# Patient Record
Sex: FEMALE | Race: OTHER
[De-identification: names, ages, dates, MRNs, and addresses within clinical notes are randomized per-mention and may not be internally consistent; named-entity substitution may affect disease eponyms.]

---

## 2018-09-03 ENCOUNTER — HOSPITAL ENCOUNTER (EMERGENCY)
Dept: HOSPITAL 74 - FER | Age: 43
Discharge: HOME | End: 2018-09-03
Payer: COMMERCIAL

## 2018-09-03 VITALS — DIASTOLIC BLOOD PRESSURE: 81 MMHG | HEART RATE: 72 BPM | TEMPERATURE: 98.5 F | SYSTOLIC BLOOD PRESSURE: 126 MMHG

## 2018-09-03 VITALS — BODY MASS INDEX: 27.4 KG/M2

## 2018-09-03 DIAGNOSIS — K21.9: ICD-10-CM

## 2018-09-03 DIAGNOSIS — R05: Primary | ICD-10-CM

## 2018-09-03 DIAGNOSIS — D50.9: ICD-10-CM

## 2018-09-03 LAB
ALBUMIN SERPL-MCNC: 3.8 G/DL (ref 3.5–5)
ALP SERPL-CCNC: 70 U/L (ref 32–92)
ALT SERPL-CCNC: 11 U/L (ref 10–40)
ANION GAP SERPL CALC-SCNC: 7 MMOL/L (ref 8–16)
ANISOCYTOSIS BLD QL: (no result)
AST SERPL-CCNC: 15 U/L (ref 10–42)
BACTERIA #/AREA URNS HPF: (no result) /HPF
BILIRUB SERPL-MCNC: 1 MG/DL (ref 0.2–1)
BILIRUB UR STRIP.AUTO-MCNC: (no result) MG/DL
BUN SERPL-MCNC: 7 MG/DL (ref 7–18)
CALCIUM SERPL-MCNC: 8.8 MG/DL (ref 8.4–10.2)
CHLORIDE SERPL-SCNC: 106 MMOL/L (ref 98–107)
CO2 SERPL-SCNC: 24 MMOL/L (ref 22–28)
COLOR UR: (no result)
CREAT SERPL-MCNC: 0.7 MG/DL (ref 0.6–1.3)
DEPRECATED RDW RBC AUTO: 17.3 % (ref 11.6–15.6)
GLUCOSE SERPL-MCNC: 101 MG/DL (ref 74–106)
HCT VFR BLD CALC: 25.1 % (ref 32.4–45.2)
HGB BLD-MCNC: 7.5 GM/DL (ref 10.7–15.3)
KETONES UR QL STRIP: (no result)
LIPASE SERPL-CCNC: 73 U/L (ref 73–393)
MCH RBC QN AUTO: 19.5 PG (ref 25.7–33.7)
MCHC RBC AUTO-ENTMCNC: 29.9 G/DL (ref 32–36)
MCV RBC: 65.2 FL (ref 80–96)
PH UR: >= 9 [PH] (ref 4.5–8)
PLATELET # BLD AUTO: 213 K/MM3 (ref 134–434)
PLATELET BLD QL SMEAR: ADEQUATE
PMV BLD: 8.8 FL (ref 7.5–11.1)
POTASSIUM SERPLBLD-SCNC: 3.9 MMOL/L (ref 3.5–5.1)
PROT SERPL-MCNC: 7 G/DL (ref 6.4–8.3)
PROT UR QL STRIP: (no result)
RBC # BLD AUTO: (no result) /HPF (ref 0–3)
RBC # BLD AUTO: 3.85 M/MM3 (ref 3.6–5.2)
SODIUM SERPL-SCNC: 137 MMOL/L (ref 136–145)
SP GR UR: 1.01 (ref 1–1.02)
UROBILINOGEN UR STRIP-MCNC: 1 MG/DL (ref 0.2–1)
WBC # BLD AUTO: 12.1 K/MM3 (ref 4–10.8)
WBC # UR AUTO: (no result) /UL (ref 0–5)

## 2018-09-03 PROCEDURE — 3E033GC INTRODUCTION OF OTHER THERAPEUTIC SUBSTANCE INTO PERIPHERAL VEIN, PERCUTANEOUS APPROACH: ICD-10-PCS

## 2018-09-03 PROCEDURE — 3E0337Z INTRODUCTION OF ELECTROLYTIC AND WATER BALANCE SUBSTANCE INTO PERIPHERAL VEIN, PERCUTANEOUS APPROACH: ICD-10-PCS

## 2018-09-03 NOTE — PDOC
*Physical Exam





- Vital Signs


 Last Vital Signs











Temp Pulse Resp BP Pulse Ox


 


 98.5 F   72   18   126/81   99 


 


 09/03/18 18:20  09/03/18 18:20  09/03/18 18:20  09/03/18 18:20  09/03/18 18:20














ED Treatment Course





- LABORATORY


CBC & Chemistry Diagram: 


 09/03/18 19:08





 09/03/18 19:08





- ADDITIONAL ORDERS


Additional order review: 


 Laboratory  Results











  09/03/18





  18:52


 


Urine HCG, Qual  Negative














*DC/Admit/Observation/Transfer





- Discharge Dispostion


Condition at time of disposition: Stable





- Referrals


Referrals: 


Yamilka Fenton MD [Primary Care Provider] - 





- Patient Instructions





- Post Discharge Activity

## 2018-09-03 NOTE — PDOC
Attending Attestation





- Resident


Resident Name: Dhiraj Warner





- ED Attending Attestation


I have performed the following: I have examined & evaluated the patient, The 

case was reviewed & discussed with the resident, I agree w/resident's findings 

& plan, Exceptions are as noted





- HPI


HPI: 





09/05/18 07:22


Not seen by the resident. Interviewed and examined by me. See note in medical 

record.





- Physicial Exam


PE: 





09/06/18 07:14


Abdomen nondistended soft without mass or organomegaly mild suprapubic 

tenderness to deep palpation, no guarding or rebound





- Medical Decision Making





09/06/18 07:14


Assessment: Usual response to menstrual cramps, but exaggerated this cycle, 

other possibilities include gastroenteritis, ovarian disease





Plan: Symptomatic treatment, workup and further evaluation depending on 

results. Signed out to Dr. Maloney 7 PM pending lab results and response to 

therapy.

## 2018-09-03 NOTE — PDOC
History of Present Illness





- General


History Source: Patient


Exam Limitations: No Limitations





- History of Present Illness


Initial Comments: 





09/03/18 21:28


The patient is a 42 year old female with a significant past medical history of 

iron deficiency anemia (not on medication for several month), endometriosis and 

uterine fibroids who presents to the ED with complaints of abdominal pain since 

earlier today. The patient reports pain to her periumbilical region and 

superpubic region since 5am this mornirng. She states the pain is burning, 

intermittent and rates it a 8/10. Patient reports she took advil, applied a 

heating pad, and her mother gave her a powder for an upset stomach with no 

relief of present symptoms. She also reports 2 episodes of vomiting, no blood, 

and 5-6 episodes of diarrhea, no blood, associated with present symptoms. 

Patient states she typically gets these symptoms every month when she gets her 

menstrual period. She notes she is currently on her period but states her 

symptoms are worse than normal. Patient notes her menstrual period started on 

Saturday. 


Patient reports a history of UTI several months ago. 


Denies fever or chills. Denies dysuria or changes in urinary output. Denies 

chest pain or shortness of breath. Denies any other symptoms. 











<Simran Tao - Last Filed: 09/03/18 21:28>





<Lo Maloney - Last Filed: 09/04/18 03:36>





- General


Chief Complaint: Pain


Stated Complaint: ABD PAIN, VOMITING, DIARRHEA


Time Seen by Provider: 09/03/18 19:28





Past History





<Simran Tao - Last Filed: 09/03/18 21:28>





- Past Medical History


Anemia: Yes (was on iron pills)


Asthma: No


Cancer: No


Cardiac Disorders: No


CVA: No


COPD: No


CHF: No


Dementia: No


Diabetes: No


GI Disorders: No


 Disorders: No


HTN: No


Hypercholesterolemia: No


Liver Disease: No


Seizures: No


Thyroid Disease: No


Other medical history: ENDOMETRIOSIS, FIBROID





- Surgical History


Abdominal Surgery: Yes (FIBROID)


Orthopedic Surgery: Yes (Right Knee Arthroscopy x2)





- Reproductive History


Is Patient Pregnant Now?: No


Dysfunctional Uterine Bleeding: Yes





- Suicide/Smoking/Psychosocial Hx


Smoking Status: No


Smoking History: Never smoked


Have you smoked in the past 12 months: No


Number of Cigarettes Smoked Daily: 0


Information on smoking cessation initiated: No


Hx Alcohol Use: No


Drug/Substance Use Hx: No


Substance Use Type: None


Hx Substance Use Treatment: No





<Lo Maloney - Last Filed: 09/04/18 03:36>





- Past Medical History


Allergies/Adverse Reactions: 


 Allergies











Allergy/AdvReac Type Severity Reaction Status Date / Time


 


No Known Allergies Allergy   Verified 09/03/18 18:21











Home Medications: 


Ambulatory Orders





Ciprofloxacin [Cipro (Restricted To Id)] 500 mg PO Q12H #10 tablet 09/03/18 


Diclofenac Sodium [Voltaren -] 75 mg PO BID PRN #7 tablet.dr 09/03/18 


Ondansetron [Zofran Odt -] 4 mg SL BID #10 od.tablet 09/03/18 











**Review of Systems





- Review of Systems


Able to Perform ROS?: Yes


Comments:: 





09/03/18 21:28








CONSTITUTIONAL: 


Absent: fever, chills, diaphoresis, generalized weakness, malaise, loss of 

appetite


HEENT: 


Absent: rhinorrhea, nasal congestion, throat pain, throat swelling, difficulty 

swallowing, mouth swelling, ear pain, eye pain, visual Changes


CARDIOVASCULAR: 


Absent: chest pain, syncope, palpitations, irregular heart rate, lightheadedness

, peripheral edema


RESPIRATORY: 


Absent: cough, shortness of breath, dyspnea with exertion, orthopnea, wheezing, 

stridor, hemoptysis


GASTROINTESTINAL: + abdominal pain, nausea, vomiting, diarrhea 


Absent:  abdominal distension, constipation, melena, hematochezia


GENITOURINARY: 


Absent: dysuria, frequency, urgency, hesitancy, hematuria, flank pain, genital 

pain


MUSCULOSKELETAL: 


Absent: myalgia, arthralgia, joint swelling


SKIN: 


Absent: rash, itching, pallor


HEMATOLOGIC/IMMUNOLOGIC: 


Absent: easy bleeding, easy bruising, lymphadenopathy, frequent infections


ENDOCRINE:


Absent: unexplained weight gain, unexplained weight loss, heat intolerance, 

cold intolerance


NEUROLOGIC: 


Absent: headache, focal weakness or paresthesias, dizziness, unsteady gait, 

seizure, mental status changes, bladder or bowel incontinence


PSYCHIATRIC: 


Absent: anxiety, depression, suicidal or homicidal ideation, hallucinations.











All Other Systems: Reviewed and Negative





<Simran Tao - Last Filed: 09/03/18 21:28>





*Physical Exam





- Vital Signs


 Last Vital Signs











Temp Pulse Resp BP Pulse Ox


 


 98.5 F   72   18   126/81   99 


 


 09/03/18 18:20  09/03/18 18:20  09/03/18 18:20  09/03/18 18:20  09/03/18 18:20














- Physical Exam


Comments: 





09/03/18 21:29





GENERAL: The patient is awake, alert, and fully oriented, in no acute distress.


HEAD: Normal with no signs of trauma.


EYES: Pupils equal, round and reactive to light, extraocular movements intact, 

sclera anicteric, conjunctiva clear with no pallor.


ENT: + dry mucous membranes.Ears normal, nares patent, oropharynx clear without 

exudates.  


NECK: Normal range of motion, supple without lymphadenopathy, JVD, or masses.


LUNGS: Breath sounds equal, clear to auscultation bilaterally.  No wheeze/

crackles.


HEART: Regular rate and rhythm, normal S1 and S2 without murmur or rub.


ABDOMEN: + mild superspubic tenderness. Soft/nondistended. BS wnl.  No guarding 

or rebound.  No palpable masses. No hepatosplenomegaly.


 EXTREMITIES: Normal range of motion, no edema.  No clubbing or cyanosis. No 

cords, erythema, or tenderness.


NEUROLOGICAL: Cranial nerves II through XII grossly intact.  Normal speech, 

normal gait.


PSYCH: Normal mood, normal affect.


SKIN: Warm, Dry, normal turgor, no rashes or lesions noted.





<Simran Tao - Last Filed: 09/03/18 21:28>





- Vital Signs


 Last Vital Signs











Temp Pulse Resp BP Pulse Ox


 


 98.5 F   72   18   126/81   99 


 


 09/03/18 18:20  09/03/18 18:20  09/03/18 18:20  09/03/18 18:20  09/03/18 18:20














<Lo Maloney - Last Filed: 09/04/18 03:36>





ED Treatment Course





- LABORATORY


CBC & Chemistry Diagram: 


 09/03/18 19:08





 09/03/18 19:08





- ADDITIONAL ORDERS


Additional order review: 


 Laboratory  Results











  09/03/18 09/03/18





  19:08 18:52


 


Sodium  137 


 


Potassium  3.9 


 


Chloride  106 


 


Carbon Dioxide  24 


 


Anion Gap  7 L 


 


BUN  7 


 


Creatinine  0.7 


 


Creat Clearance w eGFR  > 60 


 


Random Glucose  101 


 


Calcium  8.8 


 


Total Bilirubin  1.0 


 


AST  15  D 


 


ALT  11 


 


Alkaline Phosphatase  70 


 


Total Protein  7.0 


 


Albumin  3.8 


 


Lipase  73 


 


Urine Color   Dark


 


Urine Appearance   Clear


 


Urine pH   >= 9.0 H


 


Ur Specific Gravity   1.015


 


Urine Protein   1+ H D


 


Urine Glucose (UA)   Trace


 


Urine Ketones   2+ H


 


Urine Blood   1+ H


 


Urine Nitrite   Positive


 


Urine Bilirubin   1+ H


 


Urine Urobilinogen   1.0


 


Ur Leukocyte Esterase   Negative


 


Urine RBC   20-30


 


Urine WBC   0-2


 


Urine Bacteria   Few


 


Urine HCG, Qual   Negative








 











  09/03/18





  19:08


 


RBC  3.85


 


MCV  65.2 L D


 


MCHC  29.9 L


 


RDW  17.3 H D


 


MPV  8.8


 


Neutrophils %  No Result Required.


 


Lymphocytes %  No Result Required.














- Medications


Given in the ED: 


ED Medications














Discontinued Medications














Generic Name Dose Route Start Last Admin





  Trade Name Freq  PRN Reason Stop Dose Admin


 


Sodium Chloride  1,000 mls @ 1,000 mls/hr  09/03/18 18:36  09/03/18 19:08





  Normal Saline -  IV  09/03/18 19:35  1,000 mls/hr





  ASDIR STA   Administration





     





     





     





     


 


Ondansetron HCl  4 mg  09/03/18 18:36  09/03/18 19:10





  Zofran Injection  IVPB  09/03/18 18:37  4 mg





  ONCE ONE   Administration





     





     





     





     














<Simran Tao - Last Filed: 09/03/18 21:28>





- LABORATORY


CBC & Chemistry Diagram: 


 09/03/18 19:08





 09/03/18 19:08





- ADDITIONAL ORDERS


Additional order review: 


 Laboratory  Results











  09/03/18 09/03/18





  19:08 18:52


 


Sodium  137 


 


Potassium  3.9 


 


Chloride  106 


 


Carbon Dioxide  24 


 


Anion Gap  7 L 


 


BUN  7 


 


Creatinine  0.7 


 


Creat Clearance w eGFR  > 60 


 


Random Glucose  101 


 


Calcium  8.8 


 


Total Bilirubin  1.0 


 


AST  15  D 


 


ALT  11 


 


Alkaline Phosphatase  70 


 


Total Protein  7.0 


 


Albumin  3.8 


 


Lipase  73 


 


Urine Color   Dark


 


Urine Appearance   Clear


 


Urine pH   >= 9.0 H


 


Ur Specific Gravity   1.015


 


Urine Protein   1+ H D


 


Urine Glucose (UA)   Trace


 


Urine Ketones   2+ H


 


Urine Blood   1+ H


 


Urine Nitrite   Positive


 


Urine Bilirubin   1+ H


 


Urine Urobilinogen   1.0


 


Ur Leukocyte Esterase   Negative


 


Urine RBC   20-30


 


Urine WBC   0-2


 


Urine Bacteria   Few


 


Urine HCG, Qual   Negative








 











  09/03/18





  19:08


 


RBC  3.85


 


MCV  65.2 L D


 


MCHC  29.9 L


 


RDW  17.3 H D


 


MPV  8.8


 


Neutrophils %  No Result Required.


 


Lymphocytes %  No Result Required.














- Medications


Given in the ED: 


ED Medications














Discontinued Medications














Generic Name Dose Route Start Last Admin





  Trade Name Uzair  PRN Reason Stop Dose Admin


 


Sodium Chloride  1,000 mls @ 1,000 mls/hr  09/03/18 18:36  09/03/18 19:08





  Normal Saline -  IV  09/03/18 19:35  1,000 mls/hr





  ASDIR STA   Administration





     





     





     





     


 


Ondansetron HCl  4 mg  09/03/18 18:36  09/03/18 19:10





  Zofran Injection  IVPB  09/03/18 18:37  4 mg





  ONCE ONE   Administration





     





     





     





     














<Lo Maloney - Last Filed: 09/04/18 03:36>





Progress Note





- Progress Note


Progress Note: 





Documentation has been prepared under my direction and personally reviewed by 

me in its entirety. I attest that this documented accurately reflects all work, 

treatment, procedures and medical decision making performed by me.





<Lo Maloney - Last Filed: 09/04/18 03:36>





Medical Decision Making





- Medical Decision Making





As noted above, this 42-year-old woman with a history of gastrointestinal 

symptoms that occur around her menstrual period presents with nausea/vomiting/

diarrhea (as usual during menstruation) but more severe mid abdominal/flank 

pain.  Exam as noted.


Patient was given 1 L normal saline/Zofran 4 mg IV.


Laboratory evaluation notable for significant anemia (8/25); patient has a 

history of iron deficiency anemia but has not taken iron supplement for several 

months.  Also, urinalysis notable for positive nitrite, WBCs/RBCs/few bacteria. 

Urine c&s sent.








Patient felt significantly better after 1 L normal saline and Zofran 4 mg IV.





Patient will be treated for presumed UTI with Cipro 500 mg and prescription for 

500 mg twice a day for 5 days will be sent to her pharmacy.  Meanwhile, Zofran 

ODT 4 mg will be sent to her pharmacy








<Lo Maloney - Last Filed: 09/04/18 03:36>





*DC/Admit/Observation/Transfer





- Attestations


Scribe Attestion: 





09/03/18 21:29





Documentation prepared by Simran Tao, acting as medical scribe for Lo Maloney MD





<Simran Tao - Last Filed: 09/03/18 21:28>





<Lo Maloney - Last Filed: 09/04/18 03:36>


Diagnosis at time of Disposition: 


UTI (urinary tract infection)


Qualifiers:


 Urinary tract infection type: acute cystitis Hematuria presence: without 

hematuria Qualified Code(s): N30.00 - Acute cystitis without hematuria





GERD (gastroesophageal reflux disease)


Qualifiers:


 Esophagitis presence: without esophagitis Qualified Code(s): K21.9 - Gastro-

esophageal reflux disease without esophagitis





Iron deficiency anemia


Qualifiers:


 Iron deficiency anemia type: unspecified iron deficiency Qualified Code(s): 

D50.9 - Iron deficiency anemia, unspecified








- Discharge Dispostion


Disposition: HOME


Condition at time of disposition: Stable





- Prescriptions


Prescriptions: 


Ciprofloxacin [Cipro (Restricted To Id)] 500 mg PO Q12H #10 tablet


Diclofenac Sodium [Voltaren -] 75 mg PO BID PRN #7 tablet.dr


 PRN Reason: Moderate Pain


Ondansetron [Zofran Odt -] 4 mg SL BID #10 od.tablet





- Referrals


Referrals: 


Yamilka Fenton MD [Primary Care Provider] - 





- Patient Instructions


Printed Discharge Instructions:  DI for Urinary Tract Infection (UTI)


Additional Instructions: 


 Clear liquids, advance diet cautiously as tolerated


Zofran ODT 4 mg up to twice a day as needed for nausea/vomiting


Cipro 500 mg twice a day for 5 days


Diclofenac 75 mg twice a day as needed for pain; take with food


Can use Pepcid OTC as needed for reflux stomach pain as directed


Follow-up with your general doctor within the next 3-4 days


Follow-up with your gynecologist within 1 week as discussed


Return to ER if you have severe pain/fever or have persistent vomiting





- Post Discharge Activity

## 2019-04-07 ENCOUNTER — HOSPITAL ENCOUNTER (EMERGENCY)
Dept: HOSPITAL 74 - FER | Age: 44
Discharge: HOME | End: 2019-04-07
Payer: COMMERCIAL

## 2019-04-07 VITALS — DIASTOLIC BLOOD PRESSURE: 80 MMHG | HEART RATE: 90 BPM | SYSTOLIC BLOOD PRESSURE: 120 MMHG | TEMPERATURE: 99 F

## 2019-04-07 VITALS — BODY MASS INDEX: 28.7 KG/M2

## 2019-04-07 DIAGNOSIS — J06.9: Primary | ICD-10-CM

## 2019-04-07 DIAGNOSIS — J02.9: ICD-10-CM

## 2019-04-07 PROCEDURE — 3E0F7GC INTRODUCTION OF OTHER THERAPEUTIC SUBSTANCE INTO RESPIRATORY TRACT, VIA NATURAL OR ARTIFICIAL OPENING: ICD-10-PCS

## 2019-04-07 RX ADMIN — IPRATROPIUM BROMIDE AND ALBUTEROL SULFATE SCH AMP: .5; 3 SOLUTION RESPIRATORY (INHALATION) at 12:34

## 2019-04-07 RX ADMIN — IPRATROPIUM BROMIDE AND ALBUTEROL SULFATE SCH AMP: .5; 3 SOLUTION RESPIRATORY (INHALATION) at 12:45

## 2019-04-07 NOTE — PDOC
History of Present Illness





- General


Chief Complaint: Sore Throat


Stated Complaint: SORE THROAT


Time Seen by Provider: 04/07/19 11:44


History Source: Patient


Exam Limitations: No Limitations





- History of Present Illness


Initial Comments: 





04/07/19 12:07





HPI


43 year old female with a significant past medical history of iron deficiency 

anemia, endometriosis and uterine fibroids, allergies presenting with 

productive yellow cough, nasal congestion and sore throat x 2 weeks, not 

improving. Pt states the nasal congestion has been extending down her throat 

and causing left jaw and ear pain. Has been tolerating oral and fluid intake. 

Took mucinex yesterday without relief.


Denies fever, chills, chest pain, SOB, palpitation, dizziness, weakness, N, V, D

, abdominal pain, bladder and bowel problems, leg swelling, No sick contacts or 

travel. No new changes in medications. No suspicious food intake


Allergies: NKA


Past Medical History:   iron deficiency anemia, endometriosis and uterine 

fibroids, allergies 


Social history: Lives with family. No smoking. No alcohol. No illicit drugs.


Surgical history:  noncontributory





Review of systems


Constitutional: no fevers or chills.


HEENT: no headache or dizziness. No visual/hearing disturbances. +ear pain, +

sore throat, +nasal congestion


CVS:  no cp or syncope.


Resp: no sob. +cough and congestion.


Gastrointestinal: no abdominal pain, nausea or vomiting. 


Genitourinary: no urinary sx, hematuria.


MUSCULOSKELETAL: No joint pain and swelling. No neck or back pain.


SKIN: no redness or skin changes, no discharge, no rash. No wounds.


Hematologic: no easy bruising/bleeding. 


NEUROLOGIC: No headache, dizziness, LOC or altered mental status. No weakness, 

numbness or tingling. 


Allergic/Immunologic: no allergies


All other systems reviewed and negative, or as documented in HPI. 





Physical exam:


General:   Well appearing, awake and alert, NAD. speaking full sentences.


HEENT:  NCAT, PERRL, EOMI, clear conjunctiva, anicteric, moist mucus membranes, 

clear oropharynx. Airway patent, normal phonation. Uvula midline. No sinus 

tenderness, TM clear, no pinna tenderness to manipulation. 


Neck:  neck supple, FROM


Resp:  CTAB, normal and even respirations, no respiratory distress


CVS: RRR, no murmurs, 2+ peripheral pulses throughout, no peripheral edema


Abdomen: soft, NTND, no peritoneal signs. 


Back: nontender, normal inspection and ROM


MSK:  no edema, ARROYO x4, ROM intact. No clubbing or cyanosis. normal bulk and 

tone.


Neuro: alert, no focal neuro deficits. 


Skin: warm and well perfused, cap refill <2 sec, normal color for ethnicity








Past History





- Past Medical History


Allergies/Adverse Reactions: 


 Allergies











Allergy/AdvReac Type Severity Reaction Status Date / Time


 


No Known Allergies Allergy   Verified 04/07/19 11:37











Home Medications: 


Ambulatory Orders





Albuterol Sulfate Inhaler - [Ventolin HFA Inhaler -] 1 - 2 inh PO Q4H PRN #1 

inhaler 04/07/19 


Oxymetazoline 0.05% Nasal Soln [Afrin -] 2 spray NS BID PRN #1 spraybtl 04/07/ 19 








Anemia: Yes (was on iron pills)


Asthma: No


Cancer: No


Cardiac Disorders: No


CVA: No


COPD: No


CHF: No


Dementia: No


Diabetes: No


GI Disorders: No


 Disorders: No


HTN: No


Hypercholesterolemia: No


Liver Disease: No


Seizures: No


Thyroid Disease: No





- Surgical History


Abdominal Surgery: Yes (FIBROID)


Orthopedic Surgery: Yes (Right Knee Arthroscopy x2)





- Reproductive History


Dysfunctional Uterine Bleeding: Yes





- Suicide/Smoking/Psychosocial Hx


Smoking Status: No


Smoking History: Never smoked


Have you smoked in the past 12 months: No


Number of Cigarettes Smoked Daily: 0


Information on smoking cessation initiated: No


Hx Alcohol Use: No


Drug/Substance Use Hx: No


Substance Use Type: None


Hx Substance Use Treatment: No





*Physical Exam





- Vital Signs


 Last Vital Signs











Temp Pulse Resp BP Pulse Ox


 


 99 F   90   20   120/80   99 


 


 04/07/19 11:36  04/07/19 11:36  04/07/19 11:36  04/07/19 11:36  04/07/19 11:36














Medical Decision Making





- Medical Decision Making





04/07/19 12:07





See HPI for details


Vital signs reviewed, wnl. no fever, nontoxic appearing. NAD, no respiratory 

distress. appears well hydrated, exam benign.


Prior notes reviewed, including admissions, discharges and consultations. 





ED course: 2 weeks of sx, mostly URI and sore throat, likely viral syndrome. ddx

, viral syndrome, strep, pharyngitis, pna. 


- strep test negative, f/u cultures


- CXR no infiltrate, no edema or effusion, normal silhouette





given duonebs with improvement of cough. rx albuterol inhaler, use described 

prn for cough.


supportive care and hydration, no indication for abx, no systemic sx or 

toxicity or pneumonia to warrant tx.


otc nasal decongestants and/or sprays prn





Dispo: Pt informed of my clinical impression, treatment recommendations and 

disposition plan. All questions answered to patient's satisfaction and 

expressed understanding and comfort with this. Reasons for returning to the ED 

sooner discussed including new or persistent/worsening symptoms with the 

patient otherwise, follow up with primary care physician. At the time of 

discharge, the patient is alert, clinically improved, tolerating po and 

verbalizes understanding of instructions, satisfied with the care received and 

felt comfortable with the plan. Patient does not suffer from an acute life-

threatening medical condition at this time she is safe for outpatient follow-

up. 








04/07/19 12:48





04/07/19 12:53








*DC/Admit/Observation/Transfer


Diagnosis at time of Disposition: 


 Pharyngitis, URI (upper respiratory infection)








- Discharge Dispostion


Disposition: HOME


Condition at time of disposition: Improved


Decision to Admit order: No





- Prescriptions


Prescriptions: 


Albuterol Sulfate Inhaler - [Ventolin HFA Inhaler -] 1 - 2 inh PO Q4H PRN #1 

inhaler


 PRN Reason: Cough


Oxymetazoline 0.05% Nasal Soln [Afrin -] 2 spray NS BID PRN #1 spraybtl


 PRN Reason: Nasal Congestion





- Referrals





- Patient Instructions


Printed Discharge Instructions:  DI for Viral Pharyngitis, Cough, DI for Viral 

Upper Respiratory Infection -- Adult


Additional Instructions: 


1) Please follow-up with your primary care doctor in the next 1-2 days. Please 

call tomorrow for for any urgent issues. 


2) You were given a copy of the tests performed today. Please bring the results 

with you and review them with your primary care doctor. Your laboratory / 

imaging results were normal, negative strep test and normal Chest xray without 

pneumonia.





3) If you have any worsening of symptoms or any other concerns please return to 

the ED immediately. Return if worsening symptoms including fevers, headache, 

vomiting, visual or hearing disturbances, abdominal pain, chest pain, shortness 

of breath, syncope, dehydration, inability to take things by mouth/vomiting, 

altered mental status, or worsening concerning symptoms. 


4) Please continue taking your home medications as directed. your medications 

on discharge include albuterol inhaler as described below, and over the counter 

cough drops or mucinex or sinus decongestant.. side effects may include upset 

stomach, abdominal pain, vomiting, or diarrhea. do not drink alcohol with your 

medications.





salt water gargles and warm lemon tea is appropriate as well for soothing 

qualities for sore throat/cough.


minimize spread of infection given contagious nature, and cover your mouth and 

wash your hands adequately with soap and water.


Stay well hydrated and rest. 





May use the albuterol inhaler every 4-6 hours as needed for cough and breathing 

to clear up your airways.


you can also use afrin sprays for nasal decongestant twice a day (each nostril, 

do not use more than 3 days due to rebound nasal congestion) or over the 

counter sudafed at the pharmacy.


Return precautions include respiratory distress, difficulty breathing, chest 

pain, lethargy, confusion, dehydration, high fevers or pain.





Stay well hydrated and rest adequately. an appointment. If you cannot follow-up 

with your primary care doctor please return to the ED 





- Post Discharge Activity


Forms/Work/School Notes:  Back to Work

## 2019-09-28 ENCOUNTER — HOSPITAL ENCOUNTER (EMERGENCY)
Dept: HOSPITAL 74 - FER | Age: 44
Discharge: HOME | End: 2019-09-28
Payer: COMMERCIAL

## 2019-09-28 VITALS — HEART RATE: 82 BPM | SYSTOLIC BLOOD PRESSURE: 127 MMHG | DIASTOLIC BLOOD PRESSURE: 79 MMHG | TEMPERATURE: 97.8 F

## 2019-09-28 VITALS — BODY MASS INDEX: 28.5 KG/M2

## 2019-09-28 DIAGNOSIS — N93.8: ICD-10-CM

## 2019-09-28 DIAGNOSIS — K52.9: Primary | ICD-10-CM

## 2019-09-28 DIAGNOSIS — D64.9: ICD-10-CM

## 2019-09-28 LAB
ALBUMIN SERPL-MCNC: 3.8 G/DL (ref 3.4–5)
ALP SERPL-CCNC: 69 U/L (ref 45–117)
ALT SERPL-CCNC: 11 U/L (ref 13–61)
ANION GAP SERPL CALC-SCNC: 5 MMOL/L (ref 8–16)
ANISOCYTOSIS BLD QL: (no result)
AST SERPL-CCNC: 15 U/L (ref 15–37)
BASOPHILS # BLD: 0.5 % (ref 0–2)
BILIRUB SERPL-MCNC: 0.5 MG/DL (ref 0.2–1)
BUN SERPL-MCNC: 10 MG/DL (ref 7–18)
CALCIUM SERPL-MCNC: 8.4 MG/DL (ref 8.5–10)
CHLORIDE SERPL-SCNC: 112 MMOL/L (ref 98–107)
CO2 SERPL-SCNC: 21 MMOL/L (ref 21–32)
CREAT SERPL-MCNC: 0.7 MG/DL (ref 0.55–1.3)
DEPRECATED RDW RBC AUTO: 18.2 % (ref 11.6–15.6)
EOSINOPHIL # BLD: 0 % (ref 0–4.5)
GLUCOSE SERPL-MCNC: 109 MG/DL (ref 74–106)
HCT VFR BLD CALC: 26.9 % (ref 32.4–45.2)
HGB BLD-MCNC: 8.3 GM/DL (ref 10.7–15.3)
LYMPHOCYTES # BLD: 5.9 % (ref 8–40)
MCH RBC QN AUTO: 21.9 PG (ref 25.7–33.7)
MCHC RBC AUTO-ENTMCNC: 30.7 G/DL (ref 32–36)
MCV RBC: 71.4 FL (ref 80–96)
MONOCYTES # BLD AUTO: 5.4 % (ref 3.8–10.2)
NEUTROPHILS # BLD: 88.2 % (ref 42.8–82.8)
PLATELET # BLD AUTO: 305 K/MM3 (ref 134–434)
PLATELET BLD QL SMEAR: ADEQUATE
PMV BLD: 11.3 FL (ref 7.5–11.1)
POTASSIUM SERPLBLD-SCNC: 4 MMOL/L (ref 3.5–5.1)
PROT SERPL-MCNC: 6.7 G/DL (ref 6.4–8.2)
RBC # BLD AUTO: 3.77 M/MM3 (ref 3.6–5.2)
RBC MORPH BLD: YES
SODIUM SERPL-SCNC: 138 MMOL/L (ref 136–145)
WBC # BLD AUTO: 13.2 K/MM3 (ref 4–10.8)

## 2019-09-28 PROCEDURE — 3E0337Z INTRODUCTION OF ELECTROLYTIC AND WATER BALANCE SUBSTANCE INTO PERIPHERAL VEIN, PERCUTANEOUS APPROACH: ICD-10-PCS | Performed by: EMERGENCY MEDICINE

## 2019-09-28 PROCEDURE — 3E033GC INTRODUCTION OF OTHER THERAPEUTIC SUBSTANCE INTO PERIPHERAL VEIN, PERCUTANEOUS APPROACH: ICD-10-PCS | Performed by: EMERGENCY MEDICINE

## 2019-09-28 PROCEDURE — 3E033NZ INTRODUCTION OF ANALGESICS, HYPNOTICS, SEDATIVES INTO PERIPHERAL VEIN, PERCUTANEOUS APPROACH: ICD-10-PCS | Performed by: EMERGENCY MEDICINE

## 2019-09-28 NOTE — PDOC
Attending Attestation





- Resident


Resident Name: Romel Taveras





- ED Attending Attestation


I have performed the following: I have examined & evaluated the patient, The 

case was reviewed & discussed with the resident, I agree w/resident's findings 

& plan





- HPI


HPI: 





09/28/19 12:18


Healthy 43-year-old female presents after developing nausea, vomiting, diarrhea

, and stomach cramping after eating mussels last night.  She ate the mussels 

around 8 PM, and then she developed nausea vomiting and diarrhea around 1 AM, 

waking her from sleep.  She had about 4 episodes of vomiting during the night, 

and she was having constant loose watery diarrhea for more than an hour.





At this time, her nausea and vomiting has resolved after Zofran.  Her diarrhea 

has also resolved.  The abdominal pain is also gone.





- Physicial Exam


PE: 





09/28/19 12:20


 Vital Signs (72 hours)











  09/28/19





  10:47


 


Temperature 97.8 F


 


Pulse Rate 82


 


Respiratory 20





Rate 


 


Blood Pressure 127/79


 


O2 Sat by Pulse 100





Oximetry (%) 








The patient is awake, alert, and appears well.  Heart and lungs are normal.  

Abdomen is soft and nontender with normal bowel sounds.  There is no guarding 

and no rebound tenderness.  Skin is warm and dry, no rash.





- Medical Decision Making





09/28/19 12:20


The patient's history is most suggestive of food poisoning.  Also possible is 

gastroenteritis.  Her symptoms are rapidly resolving at this time.  Her 

abdominal examination is benign.  Her laboratory studies are notable for an 

elevation of her white blood cell count, consistent with food poisoning.  The 

benign examination of the abdomen suggests no other serious pathology.  There 

is no McBurney's tenderness, there is no right upper quadrant tenderness or 

Fierro sign.  The lipase is normal.





Impression: Probable food poisoning





Plan: Patient received IV fluids, IV acetaminophen, and IV Zofran.  She is now 

feeling much better.  She is stable for discharge with anti-emetic medication.

## 2019-09-28 NOTE — PDOC
History of Present Illness





- General


Chief Complaint: Vomiting/Diarrhea


Stated Complaint: VOMITING





- History of Present Illness


Initial Comments: 





The pt is a 43F w/ no reported PMH who presents for evaluation of approximately 

12 hours of abdominal pain, nausea, vomiting, and diarrhea. The pt reports 

eating clam/mussel pasta last night at approximately 2000. She notes that 

several hours later she began to experience cramping left side abdominal pain 

that has since migrated to her epigastric/suprapubic regions. The pain is 

cramping, non-radiating, constant, associated with 4-5 episodes of vomiting and 

5-5 episodes of NB diarrhea, and is not exacerbated or alleviated by anything 

she can identify. 


She has not tried taking anything for her symptoms and no one else shared the 

food she had.


She denies fevers/chills, chest pain, trouble breathing, dysuria, hematuria, or 

blood in her stool.


09/28/19 11:14








Past History





- Past Medical History


Allergies/Adverse Reactions: 


 Allergies











Allergy/AdvReac Type Severity Reaction Status Date / Time


 


No Known Allergies Allergy   Verified 09/28/19 10:48











Home Medications: 


Ambulatory Orders





Ondansetron [Zofran *Odt*] 4 mg SL TID #16 od.tablet 09/28/19 








Anemia: Yes (was on iron pills)


Asthma: No


Cancer: No


Cardiac Disorders: No


CVA: No


COPD: No


CHF: No


Dementia: No


Diabetes: No


GI Disorders: No


 Disorders: No


HTN: No


Hypercholesterolemia: No


Liver Disease: No


Seizures: No


Thyroid Disease: No





- Surgical History


Abdominal Surgery: Yes (FIBROID)


Orthopedic Surgery: Yes (Right Knee Arthroscopy x2)





- Reproductive History


Dysfunctional Uterine Bleeding: Yes





- Immunization History


Immunization Up to Date: No





- Psycho Social/Smoking Cessation Hx


Smoking Status: No


Smoking History: Never smoked


Have you smoked in the past 12 months: No


Number of Cigarettes Smoked Daily: 0


Hx Alcohol Use: No


Drug/Substance Use Hx: No


Substance Use Type: None


Hx Substance Use Treatment: No





**Review of Systems





- Review of Systems


Able to Perform ROS?: Yes


Comments:: 





GENERAL/CONSTITUTIONAL: No fever or chills. No weakness


HEAD, EYES, EARS, NOSE AND THROAT: No change in vision. No change in hearing. 

No sore throat


CARDIOVASCULAR: No chest pain or shortness of breath


RESPIRATORY: Denies cough


GASTROINTESTINAL: +N/V/D


GENITOURINARY: No dysuria, frequency, or change in urination


MUSCULOSKELETAL: No joint or muscle swelling or pain. No neck or back pain


SKIN: No rash


NEUROLOGIC: No headache, loss of consciousness, or change in strength/sensation


ENDOCRINE: No increased thirst. No abnormal weight change


HEMATOLOGIC/LYMPHATIC: No anemia, easy bleeding


ALLERGIC/IMMUNOLOGIC: No hives or skin allergy





09/28/19 10:52





Is the patient limited English proficient: No





*Physical Exam





- Vital Signs





 Vital Signs











 Period  Temp  Pulse  Resp  BP Sys/Roberts  Pulse Ox


 


 Last 24 Hr  97.8 F  82  20  127/79  100











09/28/19 11:18








- Physical Exam


Comments: 





GENERAL: Awake, alert, and oriented to person/place/time, appears uncomfortable


HEAD: No signs of trauma, normocephalic, atraumatic


EYES: PERRLA, EOMI, sclera anicteric, conjunctiva clear


ENT: Hearing grossly normal, nares patent, oropharynx clear without exudates. 

Moist mucosa


LUNGS: No distress, speaks in full sentences, clear to auscultation bilaterally


HEART: Regular rate and rhythm, normal S1 and S2, no murmurs appreciated, 

peripheral pulses normal and equal bilaterally


ABDOMEN: Soft, protuberant, mild epigastric and suprapubic TTP w/o rebound or 

guarding, normoactive bowel sounds


EXTREMITIES: Normal inspection, Normal range of motion, no edema. No clubbing 

or cyanosis


NEUROLOGICAL: Cranial nerves II through XII grossly intact. Normal speech, 

normal gait, no focal sensorimotor deficits


SKIN: Warm, Dry





09/28/19 10:52





ED Treatment Course





- LABORATORY


CBC & Chemistry Diagram: 


 09/28/19 11:10





 09/28/19 11:10





Medical Decision Making





- Medical Decision Making





The pt is a 43F w/ no reported PMH who presents for evaluation of 12 hours of 

abdominal pain w/ associated N/V/D s/p clam/mussel pasta last night. 





Story most concerning for food poisoning vs acute gastroenteritis


Will obtain CMP, CBC, Lipase to evaluate for pancreatitis, biliary disease, 

evidence of systemic inflammation, or lyte dysfunction


IVF, Ofirmev, Zofran, Pepcid, and Maalox for symptomatic relief.


Will reassess


09/28/19 11:19





Pt's feels pain is improved and not nauseated


Lytes unremarkable


No DEBRA


LFTs wnl


Lipase wnl


CBC pending


09/28/19 12:12





Anemia to 8.3, improved from previous


Mild leukocytosis, however no tachycardia and afebrile


Pt feels much improved at this time and tolerating PO


Rx for Zofran sent to pt's pharmacy


Plan for D/C w/ PCP f/u


Pt also educated about need to follow up about anemia


Discharge instructions and return precautions given


Patient in agreement and verbalized understanding


Dispo: Home


09/28/19 12:19








Discharge





- Discharge Information


Problems reviewed: Yes


Clinical Impression/Diagnosis: 


 Gastroenteritis





Condition: Improved





- Admission


No





- Additional Discharge Information


Prescriptions: 


Ondansetron [Zofran *Odt*] 4 mg SL TID #16 od.tablet





- Follow up/Referral





- Patient Discharge Instructions


Patient Printed Discharge Instructions:  DI for Viral Gastroenteritis -- Adult


Additional Instructions: 


You were seen in the Emergency Department for evaluation of nausea/vomiting/

diarrhea. You were treated with fluids, Zofran, Pepcid, and Maalox. Your labs 

were unremarkable. Review the handout provided at discharge. Follow up with 

your primary care provider within a week. Be sure to address your anemia with 

them at that time. Your hemoglobin today was 8.3.


Continue to drink fluids regularly and advance to solids as tolerated.


Return to the Emergency Department if you develop fevers, inability to tolerate 

liquids, blood in your stool or vomit, intractable pain, worsening symptoms, or 

any new/concerning symptoms.





- Post Discharge Activity